# Patient Record
Sex: MALE | URBAN - METROPOLITAN AREA
[De-identification: names, ages, dates, MRNs, and addresses within clinical notes are randomized per-mention and may not be internally consistent; named-entity substitution may affect disease eponyms.]

---

## 2020-12-18 ENCOUNTER — OFFICE VISIT (OUTPATIENT)
Dept: URBAN - METROPOLITAN AREA CLINIC 91 | Facility: CLINIC | Age: 85
End: 2020-12-18
Payer: MEDICARE

## 2020-12-18 DIAGNOSIS — H35.3132 BILATERAL NONEXUDATIVE AGE-RELATED MACULAR DEGENERATION, INTERMEDIATE DRY STAGE: Primary | ICD-10-CM

## 2020-12-18 DIAGNOSIS — H04.123 DRY EYE SYNDROME OF BILATERAL LACRIMAL GLANDS: ICD-10-CM

## 2020-12-18 DIAGNOSIS — H02.831 DERMATOCHALASIS OF RIGHT UPPER EYELID: ICD-10-CM

## 2020-12-18 DIAGNOSIS — H35.033 HYPERTENSIVE RETINOPATHY, BILATERAL: ICD-10-CM

## 2020-12-18 PROCEDURE — 92134 CPTRZ OPH DX IMG PST SGM RTA: CPT | Performed by: OPHTHALMOLOGY

## 2020-12-18 PROCEDURE — 92014 COMPRE OPH EXAM EST PT 1/>: CPT | Performed by: OPHTHALMOLOGY

## 2020-12-18 ASSESSMENT — INTRAOCULAR PRESSURE
OD: 15
OS: 14

## 2020-12-18 NOTE — IMPRESSION/PLAN
Impression: Bilateral nonexudative age-related macular degeneration, intermediate dry stage: H35.3132. Plan: OCTM stable at this time. For drusen I have recommended patient use an Amsler grid daily to check their vision, and to call our office immediately if change is noted. I also explained the AREDS vitamin study, and advised patient that it would be beneficial for them to take. I stressed the importance of compliance and regular follow-up appointments.

## 2020-12-18 NOTE — IMPRESSION/PLAN
Impression: Dermatochalasis of left upper eyelid: H02.834. Plan: Drooping upper eyelids were discussed with patient. Patient will let us know when they feel upper lids are affecting their field of vision or daily life. Return for [facility] - BATH testing in 1 month with Dr. Burt Graves.

## 2021-07-09 ENCOUNTER — OFFICE VISIT (OUTPATIENT)
Dept: URBAN - METROPOLITAN AREA CLINIC 91 | Facility: CLINIC | Age: 86
End: 2021-07-09
Payer: MEDICARE

## 2021-07-09 DIAGNOSIS — H02.834 DERMATOCHALASIS OF LEFT UPPER EYELID: ICD-10-CM

## 2021-07-09 PROCEDURE — 99213 OFFICE O/P EST LOW 20 MIN: CPT | Performed by: OPHTHALMOLOGY

## 2021-07-09 ASSESSMENT — INTRAOCULAR PRESSURE
OD: 15
OS: 14

## 2021-07-09 NOTE — IMPRESSION/PLAN
Impression: Bilateral nonexudative age-related macular degeneration, intermediate dry stage: H35.3132. Plan: For macular degeneration OU, I recommended using an Amsler grid daily to check vision, and to call office immediately if change is noted. AREDS vitamins were also recommended. Importance of compliance and regular follow-up appointments was emphasized. For follow-up and possible progression of macular degeneration, I have recommended an OCTm to be performed.

## 2022-06-24 ENCOUNTER — OFFICE VISIT (OUTPATIENT)
Dept: URBAN - METROPOLITAN AREA CLINIC 91 | Facility: CLINIC | Age: 87
End: 2022-06-24
Payer: MEDICARE

## 2022-06-24 DIAGNOSIS — H35.3132 BILATERAL NONEXUDATIVE AGE-RELATED MACULAR DEGENERATION, INTERMEDIATE DRY STAGE: Primary | ICD-10-CM

## 2022-06-24 DIAGNOSIS — H52.03 HYPERMETROPIA, BILATERAL: ICD-10-CM

## 2022-06-24 DIAGNOSIS — H04.123 DRY EYE SYNDROME OF BILATERAL LACRIMAL GLANDS: ICD-10-CM

## 2022-06-24 DIAGNOSIS — H35.033 HYPERTENSIVE RETINOPATHY, BILATERAL: ICD-10-CM

## 2022-06-24 PROCEDURE — 92134 CPTRZ OPH DX IMG PST SGM RTA: CPT | Performed by: OPHTHALMOLOGY

## 2022-06-24 PROCEDURE — 99214 OFFICE O/P EST MOD 30 MIN: CPT | Performed by: OPHTHALMOLOGY

## 2022-06-24 ASSESSMENT — INTRAOCULAR PRESSURE
OD: 10
OS: 11

## 2022-06-24 ASSESSMENT — VISUAL ACUITY
OS: 20/20
OD: 20/20

## 2022-06-24 NOTE — IMPRESSION/PLAN
Impression: Bilateral nonexudative age-related macular degeneration, intermediate dry stage: H35.3132. Plan: OCTM reviewed with DRY AMD OU For macular degeneration OU, I recommended using an Amsler grid daily to check vision, and to call office immediately if change is noted. AREDS vitamins were also recommended. Importance of compliance and regular follow-up appointments was emphasized. For follow-up and possible progression of macular degeneration, I have recommended an OCTm to be performed.

## 2022-06-24 NOTE — IMPRESSION/PLAN
Impression: Hypertensive retinopathy, bilateral: H35.033. Plan: Evidence of hypertensive retinopathy found. The patient was advised to see primary care physician to keep blood pressure under good control. Risk of sudden vision loss associated with hypertension was discussed with patient. If vision suddenly changes, patient should notify our office immediately. 

Encouraged patient to ask PCP if he may take baby aspirin